# Patient Record
Sex: MALE | Race: WHITE | URBAN - METROPOLITAN AREA
[De-identification: names, ages, dates, MRNs, and addresses within clinical notes are randomized per-mention and may not be internally consistent; named-entity substitution may affect disease eponyms.]

---

## 2021-10-25 ENCOUNTER — OFFICE VISIT (OUTPATIENT)
Dept: ENDOCRINOLOGY | Age: 71
End: 2021-10-25
Payer: MEDICARE

## 2021-10-25 VITALS
HEIGHT: 72 IN | WEIGHT: 198.4 LBS | SYSTOLIC BLOOD PRESSURE: 123 MMHG | HEART RATE: 65 BPM | BODY MASS INDEX: 26.87 KG/M2 | DIASTOLIC BLOOD PRESSURE: 80 MMHG

## 2021-10-25 DIAGNOSIS — E03.9 ACQUIRED HYPOTHYROIDISM: ICD-10-CM

## 2021-10-25 DIAGNOSIS — Z79.4 TYPE 2 DIABETES MELLITUS WITH HYPERGLYCEMIA, WITH LONG-TERM CURRENT USE OF INSULIN (HCC): Primary | ICD-10-CM

## 2021-10-25 DIAGNOSIS — E11.65 TYPE 2 DIABETES MELLITUS WITH HYPERGLYCEMIA, WITH LONG-TERM CURRENT USE OF INSULIN (HCC): Primary | ICD-10-CM

## 2021-10-25 LAB — HBA1C MFR BLD HPLC: 8.4 %

## 2021-10-25 PROCEDURE — 3046F HEMOGLOBIN A1C LEVEL >9.0%: CPT | Performed by: INTERNAL MEDICINE

## 2021-10-25 PROCEDURE — 1101F PT FALLS ASSESS-DOCD LE1/YR: CPT | Performed by: INTERNAL MEDICINE

## 2021-10-25 PROCEDURE — G8427 DOCREV CUR MEDS BY ELIG CLIN: HCPCS | Performed by: INTERNAL MEDICINE

## 2021-10-25 PROCEDURE — 3017F COLORECTAL CA SCREEN DOC REV: CPT | Performed by: INTERNAL MEDICINE

## 2021-10-25 PROCEDURE — 2022F DILAT RTA XM EVC RTNOPTHY: CPT | Performed by: INTERNAL MEDICINE

## 2021-10-25 PROCEDURE — G8419 CALC BMI OUT NRM PARAM NOF/U: HCPCS | Performed by: INTERNAL MEDICINE

## 2021-10-25 PROCEDURE — G8432 DEP SCR NOT DOC, RNG: HCPCS | Performed by: INTERNAL MEDICINE

## 2021-10-25 PROCEDURE — 83036 HEMOGLOBIN GLYCOSYLATED A1C: CPT | Performed by: INTERNAL MEDICINE

## 2021-10-25 PROCEDURE — 99204 OFFICE O/P NEW MOD 45 MIN: CPT | Performed by: INTERNAL MEDICINE

## 2021-10-25 PROCEDURE — G8536 NO DOC ELDER MAL SCRN: HCPCS | Performed by: INTERNAL MEDICINE

## 2021-10-25 RX ORDER — LEVOTHYROXINE SODIUM 88 UG/1
1 TABLET ORAL DAILY
COMMUNITY
Start: 2021-09-24

## 2021-10-25 RX ORDER — ASPIRIN 81 MG/1
1 TABLET ORAL DAILY
COMMUNITY
Start: 2021-07-26 | End: 2022-08-01 | Stop reason: SDUPTHER

## 2021-10-25 RX ORDER — INSULIN ASPART 100 [IU]/ML
8 INJECTION, SOLUTION INTRAVENOUS; SUBCUTANEOUS
COMMUNITY

## 2021-10-25 RX ORDER — INSULIN DETEMIR 100 [IU]/ML
12 INJECTION, SOLUTION SUBCUTANEOUS
COMMUNITY
Start: 2021-09-28 | End: 2022-08-01 | Stop reason: SDUPTHER

## 2021-10-25 NOTE — PROGRESS NOTES
Chief Complaint   Patient presents with    New Patient     PCP and Pharmacy verified    Diabetes       HPI  This is a 69-year-old male with a diagnosis of type 2 diabetes mellitus made in 2008 when he presented with blurred vision. He has been on insulin ever since. He recently moved from [place] where he was followed by an endocrinologist to his vacation home at [facility] where he will live permanently. He is referred by Chelsea Barnes who we also see for his diabetes. He has been on basal bolus insulin ever since diagnosis. Current diabetes medications  Levemir 24 units at bedtime  NovoLog 6 for breakfast, 8-10 for lunch, and 10-14 for dinner    He uses a Dexcom G6 system. He monitors his blood sugar and actually the dosing of NovoLog is related primarily to his blood sugars throughout the day. His A1c today is 8.4%. His A1c in May was 7.5%. Breakfast is usually oatmeal with blueberries or cereal with blueberries and soy milk. Lunch is a sandwich with or without chips. Dinner can be chicken or pork with potatoes or occasionally he will just have a salad. He has a snack bar at night primarily because he has frequent episodes of hypoglycemia occurring about 3:00 in the morning. Past medical history is notable for mitral valve disease status post mitral valve replacement in March 2020. He also had an incidental single vessel coronary artery bypass graft. He also has a history of primary hypothyroidism. He was found to have a TSH of 4.38 in 2015 with TPO antibodies that were positive and he has been placed on levothyroxine 88 mcg daily. He was relatively asymptomatic pre and post diagnosis.     ROS  Review of Systems - General ROS: negative  Psychological ROS: negative  Ophthalmic ROS: negative  ENT ROS: negative  Respiratory ROS: no cough, shortness of breath, or wheezing  Cardiovascular ROS: no chest pain or dyspnea on exertion  Gastrointestinal ROS: no abdominal pain, change in bowel habits, or black or bloody stools  Genito-Urinary ROS: no dysuria, trouble voiding, or hematuria  Musculoskeletal ROS: negative  positive for - pain in wrist - right  Neurological ROS: no TIA or stroke symptoms  Dermatological ROS: negative  No family history on file. Social History     Socioeconomic History    Marital status: UNKNOWN     Spouse name: Not on file    Number of children: Not on file    Years of education: Not on file    Highest education level: Not on file   Tobacco Use    Smoking status: Never Smoker    Smokeless tobacco: Never Used     Social Determinants of Health     Financial Resource Strain:     Difficulty of Paying Living Expenses:    Food Insecurity:     Worried About Running Out of Food in the Last Year:     [address] in the Last Year:    Transportation Needs:     Lack of Transportation (Medical):      Lack of Transportation (Non-Medical):    Physical Activity:     Days of Exercise per Week:     Minutes of Exercise per Session:    Stress:     Feeling of Stress :    Social Connections:     Frequency of Communication with Friends and Family:     Frequency of Social Gatherings with Friends and Family:     Attends Zoroastrianism Services:     Active Member of [address] [place] [address] or Organizations:     Attends Club or Organization Meetings:     Marital Status:         Vitals:    10/25/21 1114   BP: 123/80   Pulse: 65   Weight: 198 lb 6.4 oz (90 kg)   Height: 6' (1.829 m)   PainSc:   0 - No pain        Physical Examination: General appearance - alert, well appearing, and in no distress  Mental status - alert, oriented to person, place, and time  Neck - supple, no significant adenopathy, thyroid exam: thyroid is normal in size without nodules or tenderness  Chest - clear to auscultation, no wheezes, rales or rhonchi, symmetric air entry  Heart - normal rate, regular rhythm, normal S1, S2, no murmurs, rubs, clicks or gallops  Abdomen - soft, nontender, nondistended, no masses or organomegaly  Neurological - alert, oriented, normal speech, no focal findings or movement disorder noted, pain overlying the right wrist  Musculoskeletal - no joint tenderness, deformity or swelling  Extremities - peripheral pulses normal, no pedal edema, no clubbing or cyanosis  Skin - normal coloration and turgor, no rashes, no suspicious skin lesions noted    Diabetic foot exam:     Left: Reflexes 2+     Vibratory sensation diminished    Filament test normal sensation with micro filament   Pulse DP: 2+ (normal)   Pulse PT: 2+ (normal)   Deformities: None  Right: Reflexes 2+   Vibratory sensation diminished   Filament test normal sensation with micro filament   Pulse DP: 2+ (normal)   Pulse PT: 2+ (normal)   Deformities: None      Test provided by his previous endocrinologist include an   A1c of 7.5% in May 2021  Creatinine 0.92  Microalbumin less than 6  TSH 2.28  ASSESSMENT & PLAN  1. Type 2 diabetes mellitus with an A1c of 8.4% on basal bolus insulin but with frequent overnight hypoglycemia  2. Primary hypothyroidism with a recent TSH that was in normal range    Plan:  1. I have split the Levemir to 12 units twice daily to avoid the overnight lows  2. Have advised him to take 6 units of NovoLog with each meal.  It appears that he has been compensating with weaning Levemir effect by increasing NovoLog dosing. This is relating to higher and low blood sugars  3. I did set him up with clarity so that he and I can share data and adjust insulin accordingly  4. We will continue the levothyroxine at 88 mcg daily  5.   I will see him back in 3 months

## 2022-01-10 ENCOUNTER — TELEPHONE (OUTPATIENT)
Dept: ENDOCRINOLOGY | Age: 72
End: 2022-01-10

## 2022-01-10 NOTE — TELEPHONE ENCOUNTER
Pt is asking a return call @ [phone] so he can share his blood sugar reading from his machine  to Dr. Lillian Bolton.

## 2022-01-10 NOTE — TELEPHONE ENCOUNTER
Patient would like the share code for our practice to send Dr. Lucita Wills his 90 day dexcom reading report. Patient stated that he would like it to be sent via text or [employer].

## 2022-01-24 ENCOUNTER — OFFICE VISIT (OUTPATIENT)
Dept: ENDOCRINOLOGY | Age: 72
End: 2022-01-24
Payer: MEDICARE

## 2022-01-24 VITALS
HEIGHT: 72 IN | DIASTOLIC BLOOD PRESSURE: 68 MMHG | SYSTOLIC BLOOD PRESSURE: 139 MMHG | HEART RATE: 60 BPM | BODY MASS INDEX: 25.65 KG/M2 | WEIGHT: 189.4 LBS

## 2022-01-24 DIAGNOSIS — Z79.4 TYPE 2 DIABETES MELLITUS WITH HYPERGLYCEMIA, WITH LONG-TERM CURRENT USE OF INSULIN (HCC): Primary | ICD-10-CM

## 2022-01-24 DIAGNOSIS — E11.65 TYPE 2 DIABETES MELLITUS WITH HYPERGLYCEMIA, WITH LONG-TERM CURRENT USE OF INSULIN (HCC): Primary | ICD-10-CM

## 2022-01-24 LAB — HBA1C MFR BLD HPLC: 9.1 %

## 2022-01-24 PROCEDURE — 3046F HEMOGLOBIN A1C LEVEL >9.0%: CPT | Performed by: INTERNAL MEDICINE

## 2022-01-24 PROCEDURE — G8428 CUR MEDS NOT DOCUMENT: HCPCS | Performed by: INTERNAL MEDICINE

## 2022-01-24 PROCEDURE — 99214 OFFICE O/P EST MOD 30 MIN: CPT | Performed by: INTERNAL MEDICINE

## 2022-01-24 PROCEDURE — G8419 CALC BMI OUT NRM PARAM NOF/U: HCPCS | Performed by: INTERNAL MEDICINE

## 2022-01-24 PROCEDURE — G8536 NO DOC ELDER MAL SCRN: HCPCS | Performed by: INTERNAL MEDICINE

## 2022-01-24 PROCEDURE — 1101F PT FALLS ASSESS-DOCD LE1/YR: CPT | Performed by: INTERNAL MEDICINE

## 2022-01-24 PROCEDURE — 3017F COLORECTAL CA SCREEN DOC REV: CPT | Performed by: INTERNAL MEDICINE

## 2022-01-24 PROCEDURE — 83036 HEMOGLOBIN GLYCOSYLATED A1C: CPT | Performed by: INTERNAL MEDICINE

## 2022-01-24 PROCEDURE — G8432 DEP SCR NOT DOC, RNG: HCPCS | Performed by: INTERNAL MEDICINE

## 2022-01-24 PROCEDURE — 2022F DILAT RTA XM EVC RTNOPTHY: CPT | Performed by: INTERNAL MEDICINE

## 2022-01-24 RX ORDER — BLOOD SUGAR DIAGNOSTIC
400 STRIP MISCELLANEOUS SEE ADMIN INSTRUCTIONS
COMMUNITY

## 2022-01-24 RX ORDER — ATORVASTATIN CALCIUM 40 MG/1
40 TABLET, FILM COATED ORAL DAILY
COMMUNITY
Start: 2021-11-04

## 2022-01-24 RX ORDER — BLOOD-GLUCOSE TRANSMITTER
EACH MISCELLANEOUS
COMMUNITY
Start: 2021-11-04 | End: 2022-02-16 | Stop reason: SDUPTHER

## 2022-01-24 RX ORDER — BLOOD-GLUCOSE SENSOR
EACH MISCELLANEOUS
COMMUNITY
Start: 2022-01-07 | End: 2022-02-16 | Stop reason: SDUPTHER

## 2022-01-24 RX ORDER — LOSARTAN POTASSIUM 25 MG/1
TABLET ORAL
COMMUNITY
Start: 2022-01-08

## 2022-01-24 NOTE — PATIENT INSTRUCTIONS
With each meal determine the grams of carbohydrate and the blood sugar:    1 units of Novolog for every 10 grams of carbohydrate plus  Add 1 units for every 50 > 150   Combine both for the mealtime bolus of Novolog    After 2 weeks send me a message and I will look at clarity tracing and contact you

## 2022-01-24 NOTE — PROGRESS NOTES
This is a 76-year-old male with a diagnosis of type 2 diabetes mellitus made in 2008 when he presented with blurred vision. He has been on insulin ever since. He recently moved from [place] where he was followed by an endocrinologist to his vacation home at [facility] where he will live permanently. He is referred by Freda Barron who we also see for his diabetes.     He has been on basal bolus insulin ever since diagnosis.     Current diabetes medications  Levemir 12 units BID  NovoLog 6 for breakfast, 8-10 for lunch, and 10-14 for dinner     He uses a Dexcom G6 system. He monitors his blood sugar and actually the dosing of NovoLog is related primarily to his blood sugars throughout the day. When I saw him last his A1c was 8.4%. His A1c today is 9.1%     Since moving to his vacation home, they have been doing a lot of remodeling and there is been a lot of chaos. As a result, he is not paying as much attention to his diabetes as he was previously. I did download the Dexcom and there are lots of very high blood sugars consistent with the A1c of 9.1%. He is surprised. Breakfast is usually oatmeal with blueberries or cereal with blueberries and soy milk. Lunch is a sandwich with or without chips. [employer] can be chicken or pork with potatoes or occasionally he will just have a salad.        Past medical history is notable for mitral valve disease status post mitral valve replacement in March 2020. He also had an incidental single vessel coronary artery bypass graft. He also has a history of primary hypothyroidism. He was found to have a TSH of 4.38 in 2015 with TPO antibodies that were positive and he has been placed on levothyroxine 88 mcg daily. He was relatively asymptomatic pre and post diagnosis.     Examination  Blood pressure 139/68  Pulse 60  Weight 189  BMI 25.7    HEENT unremarkable  Lungs clear  Heart reveals a regular rate and rhythm  Abdomen benign  Extremities unremarkable  Diabetic foot exam: Left Foot:   Visual Exam: normal    Pulse DP: 2+ (normal)   Filament test: normal sensation    Vibratory sensation: normal      Right Foot:   Visual Exam: normal    Pulse DP: 2+ (normal)   Filament test: normal sensation    Vibratory sensation: normal    Impression  1. Type 2 diabetes mellitus with significant deterioration in glucose control likely related to some inattention to his diabetes related in turn to the activities around his new home  2. Primary hypothyroidism    Plan:  1. I have given him direction in terms of basal insulin, mealtime insulin, and correction. He was given the after visit summary to take with him. 2.  I asked him that once he gets his new phone set up with clarity to let me know and so we can begin to make adjustments in his insulin. Hopefully the 1-10 and 1-50 will be correct but we will certainly make changes as needed. He did have diabetes education at [address] [place] [address] in 2019 but likely needs further guidance on carbs. 3.  I will see him back in 3 months. Please note that this dictation was completed with [employer], the computer voice recognition software. Quite often unanticipated grammatical, syntax, homophones, and other interpretive errors are inadvertently transcribed by the computer software. Please disregard these errors. Please excuse any errors that have escaped final proofreading.

## 2022-02-07 ENCOUNTER — TELEPHONE (OUTPATIENT)
Dept: ENDOCRINOLOGY | Age: 72
End: 2022-02-07

## 2022-02-07 NOTE — TELEPHONE ENCOUNTER
2/7/2022  2:55 PM    Patient requesting his chart notes be faxed to [facility] on [address] in [place] at [phone] concerning his monthly renewal of his Dexcom sensors.   Can reach patient at [phone]    Thanks,  Shannon Rainey

## 2022-02-15 ENCOUNTER — TELEPHONE (OUTPATIENT)
Dept: ENDOCRINOLOGY | Age: 72
End: 2022-02-15

## 2022-02-15 NOTE — TELEPHONE ENCOUNTER
2/15/2022  4:19 PM    Nick from [employer] called and stated that the new rx needs to be sent directly to the pharmacy he is at. That address is [id number] [place] [address]. He can be reached at [phone].      Thanks,   Mony Morales

## 2022-02-15 NOTE — TELEPHONE ENCOUNTER
Spoke to [employer] with [facility] [employer]. Informed him that we faxed over the medicare form for the pt yesterday and received confirmation that it was sent.  stated that the [facility] department stated that they did not receive it.  asked for a verbal diagnosis code so that he can give it to the medicare department. I have given him E11.65 (Type 2 diabetes).

## 2022-02-15 NOTE — TELEPHONE ENCOUNTER
[facility] called 2/15 @ 2:52pm. Stated that the patient was looking to et his dexcom rx's covered through medicare, but medicare did not have the diagnosis codes. Please call medicare to give these codes. Medicare # [id number].

## 2022-02-15 NOTE — TELEPHONE ENCOUNTER
2/15/2022  12:20 PM    Mr. Mina Colon called and stated he would like to speak with the nurse in regards to his G6 prescription. He can be reached at [phone].     Thanks,   Cuco Hughes

## 2022-02-16 RX ORDER — BLOOD-GLUCOSE TRANSMITTER
EACH MISCELLANEOUS
Qty: 1 EACH | Refills: 3 | Status: SHIPPED | OUTPATIENT
Start: 2022-02-16

## 2022-02-16 RX ORDER — BLOOD-GLUCOSE SENSOR
EACH MISCELLANEOUS
Qty: 9 EACH | Refills: 3 | Status: SHIPPED | OUTPATIENT
Start: 2022-02-16

## 2022-02-16 NOTE — TELEPHONE ENCOUNTER
Requested Prescriptions     Pending Prescriptions Disp Refills    Dexcom G6 Sensor shy 9 Each 3     Sig: USE AS DIRECTED. CHANGE EVERY 10 DAYS    Blood-Glucose Transmitter (Dexcom G6 Transmitter) shy 1 Each 3     Sig: USE AS DIRECTED.  CHANGE EVERY 90 DAYS

## 2022-04-25 ENCOUNTER — OFFICE VISIT (OUTPATIENT)
Dept: ENDOCRINOLOGY | Age: 72
End: 2022-04-25
Payer: MEDICARE

## 2022-04-25 VITALS
BODY MASS INDEX: 26.09 KG/M2 | HEART RATE: 57 BPM | HEIGHT: 72 IN | SYSTOLIC BLOOD PRESSURE: 132 MMHG | WEIGHT: 192.6 LBS | DIASTOLIC BLOOD PRESSURE: 63 MMHG

## 2022-04-25 DIAGNOSIS — Z79.4 TYPE 2 DIABETES MELLITUS WITH HYPERGLYCEMIA, WITH LONG-TERM CURRENT USE OF INSULIN (HCC): Primary | ICD-10-CM

## 2022-04-25 DIAGNOSIS — E11.65 TYPE 2 DIABETES MELLITUS WITH HYPERGLYCEMIA, WITH LONG-TERM CURRENT USE OF INSULIN (HCC): Primary | ICD-10-CM

## 2022-04-25 LAB — HBA1C MFR BLD HPLC: 7.3 %

## 2022-04-25 PROCEDURE — 1101F PT FALLS ASSESS-DOCD LE1/YR: CPT | Performed by: INTERNAL MEDICINE

## 2022-04-25 PROCEDURE — G8419 CALC BMI OUT NRM PARAM NOF/U: HCPCS | Performed by: INTERNAL MEDICINE

## 2022-04-25 PROCEDURE — G8432 DEP SCR NOT DOC, RNG: HCPCS | Performed by: INTERNAL MEDICINE

## 2022-04-25 PROCEDURE — G8427 DOCREV CUR MEDS BY ELIG CLIN: HCPCS | Performed by: INTERNAL MEDICINE

## 2022-04-25 PROCEDURE — G8536 NO DOC ELDER MAL SCRN: HCPCS | Performed by: INTERNAL MEDICINE

## 2022-04-25 PROCEDURE — 3051F HG A1C>EQUAL 7.0%<8.0%: CPT | Performed by: INTERNAL MEDICINE

## 2022-04-25 PROCEDURE — 2022F DILAT RTA XM EVC RTNOPTHY: CPT | Performed by: INTERNAL MEDICINE

## 2022-04-25 PROCEDURE — 99214 OFFICE O/P EST MOD 30 MIN: CPT | Performed by: INTERNAL MEDICINE

## 2022-04-25 PROCEDURE — 3017F COLORECTAL CA SCREEN DOC REV: CPT | Performed by: INTERNAL MEDICINE

## 2022-04-25 PROCEDURE — 83036 HEMOGLOBIN GLYCOSYLATED A1C: CPT | Performed by: INTERNAL MEDICINE

## 2022-04-25 NOTE — PROGRESS NOTES
This is a 78-year-old male with a diagnosis of type 2 diabetes mellitus made in 2008 when he presented with blurred vision. [facility] has been on insulin ever since. [facility] recently moved from [place] where he was followed by an endocrinologist to his vacation home at [facility] where he will live permanently. [facility] is referred by Marcelle Sapp who we also see for his diabetes.     He has been on basal bolus insulin ever since diagnosis.     Current diabetes medications  Levemir 12 units BID  NovoLog 6 per meal plus correction of 1:50     He uses a Dexcom G6 system.  He monitors his blood sugar and actually the dosing of NovoLog is related primarily to his blood sugars throughout the day. When I saw him last his A1c was 9.1%. His A1c today is 7.3%            Since moving to his vacation home, they have been doing a lot of remodeling and there is been a lot of chaos. As a result, he is not paying as much attention to his diabetes as he was previously. I did download the Dexcom and there are lots of very high blood sugars consistent with the A1c of 9.1%. He is surprised. Breakfast is usually oatmeal with blueberries or cereal with blueberries and soy milk. Kamari Mai is a sandwich with or without chips. Volney Lout can be chicken or pork with potatoes or occasionally he will just have a salad.      Past medical history is notable for mitral valve disease status post mitral valve replacement in March 2020. [facility] also had an incidental single vessel coronary artery bypass graft. He also has a history of primary hypothyroidism.  He was found to have a TSH of 4.38 in 2015 with TPO antibodies that were positive and he has been placed on levothyroxine 88 mcg daily. [facility] was relatively asymptomatic pre and post diagnosis.       Examination  Blood pressure 132/63  Pulse 88  Weight 192  BMI 26.1  HEENT unremarkable  Lungs clear  Heart reveals a regular rate and rhythm  Abdomen benign  Extremities unremarkable  Diabetic foot exam:     Left Foot:   Visual Exam: normal    Pulse DP: 2+ (normal)   Filament test: normal sensation    Vibratory sensation: normal      Right Foot:   Visual Exam: normal    Pulse DP: 2+ (normal)   Filament test: normal sensation    Vibratory sensation: normal    Impression  1. Diabetes mellitus being managed as type I on basal bolus insulin now with improved glucose control  2. Primary hypothyroidism on 88 mcg of levothyroxine daily    Plan:  1. He would like clarification on whether he is type I or type II. He is curious about the possibility of switching to a GLP-1 instead of the insulin. 2.  I have ordered glutamic acid decarboxylase antibodies. I do note that he has been treated with insulin ever since diagnosis suggesting that he is more likely a type I and type II  3. I will call him with the results of the osiel antibodies  4. I will see him back in 4 months.     ADDENDUM: OSIEL Ab > 25,000

## 2022-08-01 ENCOUNTER — OFFICE VISIT (OUTPATIENT)
Dept: ENDOCRINOLOGY | Age: 72
End: 2022-08-01
Payer: MEDICARE

## 2022-08-01 VITALS
DIASTOLIC BLOOD PRESSURE: 70 MMHG | HEIGHT: 72 IN | SYSTOLIC BLOOD PRESSURE: 124 MMHG | HEART RATE: 59 BPM | WEIGHT: 190 LBS | BODY MASS INDEX: 25.73 KG/M2

## 2022-08-01 DIAGNOSIS — Z79.4 TYPE 2 DIABETES MELLITUS WITH HYPERGLYCEMIA, WITH LONG-TERM CURRENT USE OF INSULIN (HCC): Primary | ICD-10-CM

## 2022-08-01 DIAGNOSIS — E11.65 TYPE 2 DIABETES MELLITUS WITH HYPERGLYCEMIA, WITH LONG-TERM CURRENT USE OF INSULIN (HCC): Primary | ICD-10-CM

## 2022-08-01 LAB — HBA1C MFR BLD HPLC: 7.4 %

## 2022-08-01 PROCEDURE — 2022F DILAT RTA XM EVC RTNOPTHY: CPT | Performed by: INTERNAL MEDICINE

## 2022-08-01 PROCEDURE — G8536 NO DOC ELDER MAL SCRN: HCPCS | Performed by: INTERNAL MEDICINE

## 2022-08-01 PROCEDURE — G8432 DEP SCR NOT DOC, RNG: HCPCS | Performed by: INTERNAL MEDICINE

## 2022-08-01 PROCEDURE — G8427 DOCREV CUR MEDS BY ELIG CLIN: HCPCS | Performed by: INTERNAL MEDICINE

## 2022-08-01 PROCEDURE — 99214 OFFICE O/P EST MOD 30 MIN: CPT | Performed by: INTERNAL MEDICINE

## 2022-08-01 PROCEDURE — 1123F ACP DISCUSS/DSCN MKR DOCD: CPT | Performed by: INTERNAL MEDICINE

## 2022-08-01 PROCEDURE — 1101F PT FALLS ASSESS-DOCD LE1/YR: CPT | Performed by: INTERNAL MEDICINE

## 2022-08-01 PROCEDURE — 3051F HG A1C>EQUAL 7.0%<8.0%: CPT | Performed by: INTERNAL MEDICINE

## 2022-08-01 PROCEDURE — G8417 CALC BMI ABV UP PARAM F/U: HCPCS | Performed by: INTERNAL MEDICINE

## 2022-08-01 PROCEDURE — 3017F COLORECTAL CA SCREEN DOC REV: CPT | Performed by: INTERNAL MEDICINE

## 2022-08-01 PROCEDURE — 83036 HEMOGLOBIN GLYCOSYLATED A1C: CPT | Performed by: INTERNAL MEDICINE

## 2022-08-01 RX ORDER — ASPIRIN 81 MG/1
81 TABLET ORAL DAILY
Qty: 90 TABLET | Refills: 3 | Status: SHIPPED | OUTPATIENT
Start: 2022-08-01

## 2022-08-01 RX ORDER — PEN NEEDLE, DIABETIC 31 GX3/16"
NEEDLE, DISPOSABLE MISCELLANEOUS
Qty: 200 PEN NEEDLE | Refills: 3 | Status: SHIPPED | OUTPATIENT
Start: 2022-08-01

## 2022-08-01 RX ORDER — INSULIN DETEMIR 100 [IU]/ML
12 INJECTION, SOLUTION SUBCUTANEOUS
Qty: 30 ML | Refills: 3 | Status: SHIPPED | OUTPATIENT
Start: 2022-08-01

## 2022-08-01 NOTE — PROGRESS NOTES
This is a 65-year-old male with a diagnosis of type 1 diabetes mellitus made in 2008 when he presented with blurred vision. At our last visit, I was concerned that he had type 1 diabetes and obtain glutamic acid decarboxylase antibodies which returned positive (ALAN Ab + > 25,000) May 2022. He has been on insulin ever since. He recently moved from [place] [address] [facility] where he was followed by an endocrinologist to his vacation home at [facility] where he will live permanently. He is referred by Haydee Lopez who we also see for his diabetes. He has been on basal bolus insulin ever since diagnosis. Current diabetes medications  Levemir 12 units BID  NovoLog 6 per meal plus correction of 1:50    Past medical history is notable for mitral valve disease status post mitral valve replacement in March 2020. He also had an incidental single vessel coronary artery bypass graft. He also has a history of primary hypothyroidism. He was found to have a TSH of 4.38 in 2015 with TPO antibodies that were positive and he has been placed on levothyroxine 88 mcg daily. He was relatively asymptomatic pre and post diagnosis. He uses a Dexcom G6 system. He monitors his blood sugar and actually the dosing of NovoLog is related primarily to his blood sugars throughout the day. When I saw him last his A1c was 9.1%. His A1c today is 7.4%        Saw him last he has retired and is much more relaxed. His diet is fairly low carbohydrate and he is trying to focus on his carbohydrate intake more. Most mornings he is having at his and howard instead of oatmeal nuts and bread. He tries to bolus prior to the meal but sometimes is late on the boluses which contribute to spikes in blood sugar.   He is having occasional mild hypoglycemia but usually these are after he corrects an elevated blood sugar following a meal.    Examination  Blood pressure 124/70  Pulse 64  Weight 190  BMI 25.8  HEENT unremarkable  Lungs clear  Heart reveals a regular rate and rhythm  Abdomen benign  Extremities unremarkable  Diabetic foot exam:     Left Foot:   Visual Exam: normal    Pulse DP: 2+ (normal)   Filament test: normal sensation    Vibratory sensation: normal      Right Foot:   Visual Exam: normal    Pulse DP: 2+ (normal)   Filament test: normal sensation    Vibratory sensation: normal     Impression  1. Type 1 diabetes mellitus with fair to good glucose control on basal bolus insulin    Plan:  1. No changes in the regimen were made. 2.  I did advise him to try to bolus prior to his meals and to avoid stacking insulin after the meal  3.   I will see him back in 6 months at his request.

## 2022-11-21 RX ORDER — INSULIN ASPART 100 [IU]/ML
INJECTION, SOLUTION INTRAVENOUS; SUBCUTANEOUS
Qty: 30 ML | Refills: 3 | Status: SHIPPED | OUTPATIENT
Start: 2022-11-21

## 2022-11-21 NOTE — TELEPHONE ENCOUNTER
In the last office note, it states that the pt's Novolog regimen is 6 per meal plus correction of 1:50.

## 2022-12-08 RX ORDER — LEVOTHYROXINE SODIUM 88 UG/1
88 TABLET ORAL
Qty: 90 TABLET | Refills: 3 | Status: SHIPPED | OUTPATIENT
Start: 2022-12-08

## 2022-12-08 NOTE — TELEPHONE ENCOUNTER
Requested Prescriptions     Pending Prescriptions Disp Refills    levothyroxine (SYNTHROID) 88 mcg tablet 90 Tablet 3     Sig: Take 1 Tablet by mouth Daily (before breakfast).

## 2023-02-06 ENCOUNTER — OFFICE VISIT (OUTPATIENT)
Dept: ENDOCRINOLOGY | Age: 73
End: 2023-02-06
Payer: MEDICARE

## 2023-02-06 VITALS
WEIGHT: 192.8 LBS | HEART RATE: 51 BPM | HEIGHT: 72 IN | SYSTOLIC BLOOD PRESSURE: 139 MMHG | BODY MASS INDEX: 26.11 KG/M2 | DIASTOLIC BLOOD PRESSURE: 67 MMHG

## 2023-02-06 DIAGNOSIS — Z79.4 TYPE 2 DIABETES MELLITUS WITH HYPERGLYCEMIA, WITH LONG-TERM CURRENT USE OF INSULIN (HCC): Primary | ICD-10-CM

## 2023-02-06 DIAGNOSIS — E11.65 TYPE 2 DIABETES MELLITUS WITH HYPERGLYCEMIA, WITH LONG-TERM CURRENT USE OF INSULIN (HCC): Primary | ICD-10-CM

## 2023-02-06 LAB — HBA1C MFR BLD HPLC: 7.5 %

## 2023-02-06 PROCEDURE — 3051F HG A1C>EQUAL 7.0%<8.0%: CPT | Performed by: INTERNAL MEDICINE

## 2023-02-06 PROCEDURE — G8427 DOCREV CUR MEDS BY ELIG CLIN: HCPCS | Performed by: INTERNAL MEDICINE

## 2023-02-06 PROCEDURE — 3017F COLORECTAL CA SCREEN DOC REV: CPT | Performed by: INTERNAL MEDICINE

## 2023-02-06 PROCEDURE — G8432 DEP SCR NOT DOC, RNG: HCPCS | Performed by: INTERNAL MEDICINE

## 2023-02-06 PROCEDURE — 1123F ACP DISCUSS/DSCN MKR DOCD: CPT | Performed by: INTERNAL MEDICINE

## 2023-02-06 PROCEDURE — 83036 HEMOGLOBIN GLYCOSYLATED A1C: CPT | Performed by: INTERNAL MEDICINE

## 2023-02-06 PROCEDURE — 1101F PT FALLS ASSESS-DOCD LE1/YR: CPT | Performed by: INTERNAL MEDICINE

## 2023-02-06 PROCEDURE — 2022F DILAT RTA XM EVC RTNOPTHY: CPT | Performed by: INTERNAL MEDICINE

## 2023-02-06 PROCEDURE — G8417 CALC BMI ABV UP PARAM F/U: HCPCS | Performed by: INTERNAL MEDICINE

## 2023-02-06 PROCEDURE — 99214 OFFICE O/P EST MOD 30 MIN: CPT | Performed by: INTERNAL MEDICINE

## 2023-02-06 PROCEDURE — G8536 NO DOC ELDER MAL SCRN: HCPCS | Performed by: INTERNAL MEDICINE

## 2023-02-06 NOTE — PROGRESS NOTES
This is a 65-year-old male with a diagnosis of type 1 diabetes mellitus made in 2008 when he presented with blurred vision. At our last visit, I was concerned that he had type 1 diabetes and obtain glutamic acid decarboxylase antibodies which returned positive (ALAN Ab + > 25,000) May 2022. He has been on insulin ever since. He recently moved from [place] where he was followed by an endocrinologist to his vacation home at [facility] where he will live permanently. He is referred by Kumar Simpson who we also see for his diabetes. He has been on basal bolus insulin ever since diagnosis. Current diabetes medications  Levemir 12 units BID  NovoLog 6 per meal plus correction of 1:50     Past medical history is notable for mitral valve disease status post mitral valve replacement in March 2020. He also had an incidental single vessel coronary artery bypass graft. He also has a history of primary hypothyroidism. He was found to have a TSH of 4.38 in 2015 with TPO antibodies that were positive and he has been placed on levothyroxine 88 mcg daily. He was relatively asymptomatic pre and post diagnosis. He uses a Dexcom G6 system. He monitors his blood sugar and actually the dosing of NovoLog is related primarily to his blood sugars throughout the day. When I saw him last his A1c was 9.1%. His A1c today is 7.5%      This gentleman continues to do very well but has many questions. He particularly focused on treatment of hypoglycemia. He admits that anytime he gets symptoms of low blood sugar, he has a tendency to overtreated. He has also observed that following physical activity (generally walking) he may have a late afternoon drop in blood sugar. He describes them as being cranky. His diet is unchanged. His diet is fairly low carbohydrate and he is trying to focus on his carbohydrate intake more. Most mornings he is having at his and howard instead of oatmeal nuts and bread.   He tries to bolus prior to the meal but sometimes is late on the boluses which contribute to spikes in blood sugar. Examination  Blood pressure 139/67  Pulse 80  Weight 192  BMI 26.1    Impression  1. Type 1 diabetes mellitus on basal bolus insulin with an A1c of 7.5%  2. Primary hypothyroidism on 88 mcg of levothyroxine daily    Plan:  1. We spent a great deal of time talking about adjustments in his basal insulin. He is getting ready to go to a golf tournament and I advised him to decrease his daytime Levemir to prevent lows. 2.  I advised him to use 15 g and wait 15 minutes for episodes of low blood sugar. 3.  We did talk about the OmniPod 5. He is not sure yet whether he wants to do that or not. 4.  I did advise him about the Dexcom 7  5. I will see him back in 6 months.

## 2023-02-22 RX ORDER — BLOOD-GLUCOSE SENSOR
EACH MISCELLANEOUS
Qty: 9 EACH | Refills: 3 | Status: SHIPPED | OUTPATIENT
Start: 2023-02-22

## 2023-02-22 NOTE — TELEPHONE ENCOUNTER
Requested Prescriptions     Pending Prescriptions Disp Refills    Dexcom G6 Sensor shy 9 Each 3     Sig: USE AS DIRECTED.  CHANGE EVERY 10 DAYS

## 2023-03-06 ENCOUNTER — TELEPHONE (OUTPATIENT)
Dept: ENDOCRINOLOGY | Age: 73
End: 2023-03-06

## 2023-03-06 DIAGNOSIS — E03.9 ACQUIRED HYPOTHYROIDISM: Primary | ICD-10-CM

## 2023-03-06 LAB — CREATININE, EXTERNAL: 0.99

## 2023-03-06 NOTE — TELEPHONE ENCOUNTER
Bean Baxter with [facility] called and stated that the pt had thyroid labs done and that the results were abnormal. She stated that she sent the results electronically to Dr. Jessica Huston and would like for hin to take a look at them. Dr. Manjit Carmen was going to change the pt's levothyroxine dosage however, he would like to defer it to Dr. Jessica Huston. Bean Baxter also stated that the pt would like to try the brand synthroid medication.

## 2023-03-08 DIAGNOSIS — E03.9 ACQUIRED HYPOTHYROIDISM: ICD-10-CM

## 2023-03-08 RX ORDER — LEVOTHYROXINE SODIUM 88 UG/1
88 TABLET ORAL
Qty: 90 TABLET | Refills: 3 | Status: SHIPPED | OUTPATIENT
Start: 2023-03-08 | End: 2023-03-08 | Stop reason: SDUPTHER

## 2023-03-08 RX ORDER — LEVOTHYROXINE SODIUM 88 UG/1
88 TABLET ORAL
Qty: 90 TABLET | Refills: 3 | Status: SHIPPED | OUTPATIENT
Start: 2023-03-08

## 2023-03-08 NOTE — TELEPHONE ENCOUNTER
Spoke to Faiza and informed her that we did not receive the test results for the pt. Faiza was able to give me a verbal reading of the pt's thyroid labs. Free T4: 2.03    TSH: 1.2    Faiza stated that the pt would like a call back from Dr. Price Reason to discuss his results. Faiza also stated that the pt is currently taking 88 mcg of levothyroxine, but would like to speak with Dr. Price Reason about being put on brand synthroid. The pt's phone number is [phone].

## 2023-03-13 ENCOUNTER — TELEPHONE (OUTPATIENT)
Dept: ENDOCRINOLOGY | Age: 73
End: 2023-03-13

## 2023-03-13 NOTE — TELEPHONE ENCOUNTER
Patient left a message this morning asking us to contact [facility] to try and get a tier reduction for his Synthroid brand 88 mcg medication. [facility] however, we received a fax stating that the request has been denied. The fax stated \"the medication is already a covered drug under the pt's insurance and cannot receive a tier reduction because there are no lower cost brand name drugs. \"    I called the pt and informed him of this information. I also informed him to contact [facility]  (mail order) to see how much the medication will be witht them. Patient understood with no further questions.

## 2023-03-16 RX ORDER — INSULIN DETEMIR 100 [IU]/ML
12 INJECTION, SOLUTION SUBCUTANEOUS
Qty: 30 ML | Refills: 3 | Status: SHIPPED | OUTPATIENT
Start: 2023-03-16

## 2023-03-16 NOTE — TELEPHONE ENCOUNTER
Requested Prescriptions     Pending Prescriptions Disp Refills    insulin detemir U-100 (Levemir FlexPen) 100 unit/mL (3 mL) inpn 30 mL 3     Si Units by SubCUTAneous route Before breakfast and dinner.

## 2023-03-20 RX ORDER — LEVOTHYROXINE SODIUM 88 UG/1
88 TABLET ORAL
Qty: 90 TABLET | Refills: 3 | Status: SHIPPED | OUTPATIENT
Start: 2023-03-20

## 2023-03-20 NOTE — TELEPHONE ENCOUNTER
Pt left a message stating that he received his synthroid brand 88 mcg medication and has been taking it for about 4 days. Pt stated that he has been having leg cramps and blurred vision since starting this medication. He would prefer to go back to levothyroxine 88 mcg. Pt stated that he would like this prescription to go to the [facility] in [place] (on file).

## 2023-03-22 RX ORDER — BLOOD-GLUCOSE TRANSMITTER
EACH MISCELLANEOUS
Qty: 1 EACH | Refills: 3 | Status: SHIPPED | OUTPATIENT
Start: 2023-03-22

## 2023-03-22 RX ORDER — BLOOD-GLUCOSE SENSOR
EACH MISCELLANEOUS
Qty: 9 EACH | Refills: 3 | Status: SHIPPED | OUTPATIENT
Start: 2023-03-22

## 2023-03-22 NOTE — TELEPHONE ENCOUNTER
Requested Prescriptions     Pending Prescriptions Disp Refills    Blood-Glucose Transmitter (Dexcom G6 Transmitter) shy 1 Each 3     Sig: USE AS DIRECTED. CHANGE EVERY 90 DAYS    Dexcom G6 Sensor shy 9 Each 3     Sig: USE AS DIRECTED.  CHANGE EVERY 10 DAYS

## 2023-08-07 ENCOUNTER — OFFICE VISIT (OUTPATIENT)
Age: 73
End: 2023-08-07
Payer: COMMERCIAL

## 2023-08-07 VITALS
HEART RATE: 64 BPM | WEIGHT: 189 LBS | BODY MASS INDEX: 25.6 KG/M2 | SYSTOLIC BLOOD PRESSURE: 129 MMHG | HEIGHT: 72 IN | DIASTOLIC BLOOD PRESSURE: 74 MMHG

## 2023-08-07 DIAGNOSIS — E10.9 TYPE 1 DIABETES MELLITUS WITHOUT COMPLICATION (HCC): Primary | ICD-10-CM

## 2023-08-07 PROCEDURE — 99214 OFFICE O/P EST MOD 30 MIN: CPT | Performed by: INTERNAL MEDICINE

## 2023-08-07 PROCEDURE — 1123F ACP DISCUSS/DSCN MKR DOCD: CPT | Performed by: INTERNAL MEDICINE

## 2023-08-07 RX ORDER — INSULIN ASPART 100 [IU]/ML
INJECTION, SOLUTION INTRAVENOUS; SUBCUTANEOUS
Qty: 30 ADJUSTABLE DOSE PRE-FILLED PEN SYRINGE | Refills: 3 | Status: SHIPPED | OUTPATIENT
Start: 2023-08-07

## 2023-08-07 NOTE — PROGRESS NOTES
This is a 65-year-old male with a diagnosis of type 1 diabetes mellitus made in 2008 when he presented with blurred vision. At our last visit, I was concerned that he had type  1 diabetes and obtain glutamic acid decarboxylase antibodies which returned positive (KHOA Ab + > 25,000) May 2022. He has been on insulin ever since. He recently moved from [place] where he was followed by an endocrinologist to his vacation home at [facility] where he will live permanently. He is referred  by Laura Montoya who we also see for his diabetes. He has been on basal bolus insulin ever since diagnosis. Current diabetes medications   Levemir 12 units BID   NovoLog 6 per meal plus correction of 1:50       Past medical history is notable for mitral valve disease status post mitral valve replacement in March 2020. He also had an incidental single  vessel coronary artery bypass graft. He also has a history of primary hypothyroidism. He was found to have a TSH of 4.38 in 2015 with TPO antibodies that were  positive and he has been placed on levothyroxine 88 mcg daily. He was relatively asymptomatic pre and post diagnosis. He uses a Dexcom G6 system. He monitors his blood sugar and actually the dosing of NovoLog is related primarily to his blood sugars throughout  the day. When I saw him last his A1c was 7.5%. His A1c today is 7.9 %    His diet is unchanged. His diet is fairly low carbohydrate and he is trying  to focus on his carbohydrate intake more. Most mornings he is having at his and cueva instead of oatmeal nuts and bread. He tries to bolus prior to the meal but sometimes is late on the boluses which contribute to spikes in blood sugar. Examination  Blood pressure 129/74  Pulse 64  Weight 189  BMI 25.6  HEENT unremarkable  Lungs clear  Heart is regular rate rhythm  Abdomen benign  Extremities unremarkable      Impression  1.   Type 1 diabetes mellitus we will be glucose control basal-bolus

## 2023-09-07 RX ORDER — ASPIRIN 81 MG/1
TABLET, COATED ORAL
Qty: 90 TABLET | Refills: 3 | Status: SHIPPED | OUTPATIENT
Start: 2023-09-07

## 2023-09-12 RX ORDER — LEVOTHYROXINE SODIUM 88 UG/1
88 TABLET ORAL
Qty: 90 TABLET | Refills: 3 | Status: SHIPPED | OUTPATIENT
Start: 2023-09-12

## 2023-09-12 NOTE — TELEPHONE ENCOUNTER
Requested Prescriptions     Pending Prescriptions Disp Refills    levothyroxine (SYNTHROID) 88 MCG tablet 90 tablet 3     Sig: Take 1 tablet by mouth every morning (before breakfast)

## 2023-10-06 ENCOUNTER — TELEPHONE (OUTPATIENT)
Age: 73
End: 2023-10-06

## 2023-10-06 NOTE — TELEPHONE ENCOUNTER
Pt's wife left a message stating the pt is [place] [facility] [place]. Barbara Penelope Alex would like a call back to discuss this with Dr. Pilar Jeong.  [phone]

## 2023-10-06 NOTE — TELEPHONE ENCOUNTER
He was admitted to [facility] with cerebral hemorrhage on Monday now s/p  surgery. On anticonvulsants.   He apparently is doing well and they are hoping for discharge tomorrow

## 2023-10-12 ENCOUNTER — TELEPHONE (OUTPATIENT)
Age: 73
End: 2023-10-12

## 2023-10-12 NOTE — TELEPHONE ENCOUNTER
Pt left a message stating he was discharged from the hospital 2 days ago and thinks that his insulin needs to be adjusted. Pt stated he would like for Dr. Edel Jacobson to take a look at his dexcom readings to review his blood sugar numbers and see if any adjustments needs to be made. Pt would like us to contact his wife with the information at [phone].

## 2023-10-13 NOTE — TELEPHONE ENCOUNTER
Spoke to pt. Released from [facility] for surgical evacuation of cerebral hemorrhage. Blood sugars elevated on Levemir 11/12 and humaog 3-6 with meals.     Have increased Levemir to 14 units BID and anticipate further increase

## 2024-01-09 NOTE — TELEPHONE ENCOUNTER
Requested Prescriptions     Pending Prescriptions Disp Refills    blood glucose test strips (ACCU-CHEK GERALDINE PLUS) strip 300 each 3     Sig: Test blood sugar 3 times daily. Dx: E10.9

## 2024-01-10 RX ORDER — BLOOD SUGAR DIAGNOSTIC
STRIP MISCELLANEOUS
Qty: 300 EACH | Refills: 3 | Status: SHIPPED | OUTPATIENT
Start: 2024-01-10

## 2024-02-07 ENCOUNTER — OFFICE VISIT (OUTPATIENT)
Age: 74
End: 2024-02-07
Payer: MEDICARE

## 2024-02-07 VITALS
SYSTOLIC BLOOD PRESSURE: 131 MMHG | HEIGHT: 72 IN | BODY MASS INDEX: 25.17 KG/M2 | DIASTOLIC BLOOD PRESSURE: 72 MMHG | HEART RATE: 57 BPM | WEIGHT: 185.8 LBS

## 2024-02-07 DIAGNOSIS — E10.9 TYPE 1 DIABETES MELLITUS WITHOUT COMPLICATION (HCC): Primary | ICD-10-CM

## 2024-02-07 LAB — HBA1C MFR BLD: 8 %

## 2024-02-07 PROCEDURE — 2022F DILAT RTA XM EVC RTNOPTHY: CPT | Performed by: INTERNAL MEDICINE

## 2024-02-07 PROCEDURE — 3017F COLORECTAL CA SCREEN DOC REV: CPT | Performed by: INTERNAL MEDICINE

## 2024-02-07 PROCEDURE — 83036 HEMOGLOBIN GLYCOSYLATED A1C: CPT | Performed by: INTERNAL MEDICINE

## 2024-02-07 PROCEDURE — 99214 OFFICE O/P EST MOD 30 MIN: CPT | Performed by: INTERNAL MEDICINE

## 2024-02-07 PROCEDURE — G8428 CUR MEDS NOT DOCUMENT: HCPCS | Performed by: INTERNAL MEDICINE

## 2024-02-07 PROCEDURE — G8419 CALC BMI OUT NRM PARAM NOF/U: HCPCS | Performed by: INTERNAL MEDICINE

## 2024-02-07 PROCEDURE — 1036F TOBACCO NON-USER: CPT | Performed by: INTERNAL MEDICINE

## 2024-02-07 PROCEDURE — 1123F ACP DISCUSS/DSCN MKR DOCD: CPT | Performed by: INTERNAL MEDICINE

## 2024-02-07 PROCEDURE — 3046F HEMOGLOBIN A1C LEVEL >9.0%: CPT | Performed by: INTERNAL MEDICINE

## 2024-02-07 PROCEDURE — G8484 FLU IMMUNIZE NO ADMIN: HCPCS | Performed by: INTERNAL MEDICINE

## 2024-02-07 NOTE — PROGRESS NOTES
doing 1 unit for every 25 and correction and has been under bolusing for the meals in general which is leading to the highs and lows.    Examination  Blood pressure 131/72  Pulse 57  Weight 185  BMI 25.2  HEENT unremarkable  Lungs clear  Heart reveals a regular rate and rhythm  Abdomen benign  Extremities unremarkable    Impression  1.  Type 2 diabetes mellitus with fair glucose control on basal bolus insulin  2.  Hypothyroidism  3.  Recent cerebral hemorrhage with homonymous hemianopsia    Plan:  1.  I have encouraged him to focus more on carb counting and bolusing for his meals Premeal rather than correcting  2.  I also emphasized that he should do no more than 1 unit for every 50/150 to correct  3.  His wife and I had a conversation about an insulin pump.  After consideration, both he and his wife felt that a pump was not a good option for him right now  4.  I will see him back in 3 to 4 months

## 2024-03-28 RX ORDER — INSULIN GLARGINE 100 [IU]/ML
14 INJECTION, SOLUTION SUBCUTANEOUS 2 TIMES DAILY
Qty: 30 ML | Refills: 3 | Status: SHIPPED | OUTPATIENT
Start: 2024-03-28

## 2024-03-28 NOTE — TELEPHONE ENCOUNTER
Requested Prescriptions     Pending Prescriptions Disp Refills    insulin glargine (LANTUS SOLOSTAR) 100 UNIT/ML injection pen 30 mL 3     Sig: Inject 14 Units into the skin 2 times daily

## 2024-06-19 ENCOUNTER — OFFICE VISIT (OUTPATIENT)
Age: 74
End: 2024-06-19
Payer: MEDICARE

## 2024-06-19 VITALS
HEART RATE: 60 BPM | WEIGHT: 184 LBS | DIASTOLIC BLOOD PRESSURE: 78 MMHG | BODY MASS INDEX: 24.92 KG/M2 | SYSTOLIC BLOOD PRESSURE: 138 MMHG | HEIGHT: 72 IN

## 2024-06-19 DIAGNOSIS — E10.9 TYPE 1 DIABETES MELLITUS WITHOUT COMPLICATION (HCC): Primary | ICD-10-CM

## 2024-06-19 LAB — HBA1C MFR BLD: 7.6 %

## 2024-06-19 PROCEDURE — 3017F COLORECTAL CA SCREEN DOC REV: CPT | Performed by: INTERNAL MEDICINE

## 2024-06-19 PROCEDURE — 1036F TOBACCO NON-USER: CPT | Performed by: INTERNAL MEDICINE

## 2024-06-19 PROCEDURE — 2022F DILAT RTA XM EVC RTNOPTHY: CPT | Performed by: INTERNAL MEDICINE

## 2024-06-19 PROCEDURE — G8420 CALC BMI NORM PARAMETERS: HCPCS | Performed by: INTERNAL MEDICINE

## 2024-06-19 PROCEDURE — 1123F ACP DISCUSS/DSCN MKR DOCD: CPT | Performed by: INTERNAL MEDICINE

## 2024-06-19 PROCEDURE — G8428 CUR MEDS NOT DOCUMENT: HCPCS | Performed by: INTERNAL MEDICINE

## 2024-06-19 PROCEDURE — 99214 OFFICE O/P EST MOD 30 MIN: CPT | Performed by: INTERNAL MEDICINE

## 2024-06-19 PROCEDURE — 3046F HEMOGLOBIN A1C LEVEL >9.0%: CPT | Performed by: INTERNAL MEDICINE

## 2024-06-19 PROCEDURE — 83036 HEMOGLOBIN GLYCOSYLATED A1C: CPT | Performed by: INTERNAL MEDICINE

## 2024-06-19 RX ORDER — TAMSULOSIN HYDROCHLORIDE 0.4 MG/1
0.4 CAPSULE ORAL EVERY EVENING
COMMUNITY

## 2024-06-19 RX ORDER — PEN NEEDLE, DIABETIC 32 GX3/16"
NEEDLE, DISPOSABLE MISCELLANEOUS
Qty: 500 EACH | Refills: 3 | Status: SHIPPED | OUTPATIENT
Start: 2024-06-19

## 2024-06-19 NOTE — PROGRESS NOTES
This is a 71-year-old male with a diagnosis of type 1 diabetes mellitus made in 2008 when he presented with blurred vision.  At our last visit, I was concerned that he had type  1 diabetes and obtain glutamic acid decarboxylase antibodies which returned positive (KHOA Ab + > 25,000) May 2022.  He has been on insulin ever since.   He recently moved from [place] where he was followed by an endocrinologist to his vacation home at [facility] where he will live permanently.  He is referred  by Sancho Montiel who we also see for his diabetes.  He has been on basal bolus insulin ever since diagnosis.      Current diabetes medications  Lantus 14 units BID  NovoLog 6 per meal plus correction of 1:50          Past medical history is notable for mitral valve disease status post mitral valve replacement in March 2020.  He also had an incidental single  vessel coronary artery bypass graft.    He also has a history of primary hypothyroidism.  He was found to have a TSH of 4.38 in 2015 with TPO antibodies that were  positive and he has been placed on levothyroxine 88 mcg daily.  He was relatively asymptomatic pre and post diagnosis.    He was admitted to [facility] with cerebral hemorrhage in January 2024.now s/p  surgery. He still has residual right homonymous hemianopia. He denies any new weakness, numbness/tingling, visual changes, or other neurological issues.  He is now cleared for driving and for the most part is doing well.  He does have some mild visual field defect in the right but otherwise he is at 70+ percent.  He is also back to playing golf.        He uses a Dexcom G7 system.  He monitors his blood sugar and actually the dosing of NovoLog is related primarily to his blood sugars throughout  the day.  When I saw him last his A1c was 7.5%.  His A1c today is 7.6 %    Examination  Blood pressure 138/78  Pulse 60  Weight 84 kg  BMI 24.9  HEENT unremarkable  Lungs clear  Heart reveals regular rate and rhythm  Abdomen

## 2024-08-20 RX ORDER — LEVOTHYROXINE SODIUM 88 UG/1
88 TABLET ORAL
Qty: 90 TABLET | Refills: 3 | Status: SHIPPED | OUTPATIENT
Start: 2024-08-20

## 2024-10-16 RX ORDER — ASPIRIN 81 MG/1
TABLET, COATED ORAL
Qty: 90 TABLET | Refills: 3 | Status: SHIPPED | OUTPATIENT
Start: 2024-10-16

## 2024-12-09 ENCOUNTER — OFFICE VISIT (OUTPATIENT)
Age: 74
End: 2024-12-09
Payer: MEDICARE

## 2024-12-09 VITALS
HEART RATE: 60 BPM | DIASTOLIC BLOOD PRESSURE: 57 MMHG | SYSTOLIC BLOOD PRESSURE: 129 MMHG | WEIGHT: 186 LBS | BODY MASS INDEX: 25.19 KG/M2 | HEIGHT: 72 IN

## 2024-12-09 DIAGNOSIS — E10.9 TYPE 1 DIABETES MELLITUS WITHOUT COMPLICATION (HCC): Primary | ICD-10-CM

## 2024-12-09 LAB — HBA1C MFR BLD: 7.5 %

## 2024-12-09 PROCEDURE — 2022F DILAT RTA XM EVC RTNOPTHY: CPT | Performed by: INTERNAL MEDICINE

## 2024-12-09 PROCEDURE — 3017F COLORECTAL CA SCREEN DOC REV: CPT | Performed by: INTERNAL MEDICINE

## 2024-12-09 PROCEDURE — 1126F AMNT PAIN NOTED NONE PRSNT: CPT | Performed by: INTERNAL MEDICINE

## 2024-12-09 PROCEDURE — G8428 CUR MEDS NOT DOCUMENT: HCPCS | Performed by: INTERNAL MEDICINE

## 2024-12-09 PROCEDURE — 1036F TOBACCO NON-USER: CPT | Performed by: INTERNAL MEDICINE

## 2024-12-09 PROCEDURE — PBSHW AMB POC HEMOGLOBIN A1C: Performed by: INTERNAL MEDICINE

## 2024-12-09 PROCEDURE — 1123F ACP DISCUSS/DSCN MKR DOCD: CPT | Performed by: INTERNAL MEDICINE

## 2024-12-09 PROCEDURE — 3046F HEMOGLOBIN A1C LEVEL >9.0%: CPT | Performed by: INTERNAL MEDICINE

## 2024-12-09 PROCEDURE — 99214 OFFICE O/P EST MOD 30 MIN: CPT | Performed by: INTERNAL MEDICINE

## 2024-12-09 PROCEDURE — G8419 CALC BMI OUT NRM PARAM NOF/U: HCPCS | Performed by: INTERNAL MEDICINE

## 2024-12-09 PROCEDURE — G8484 FLU IMMUNIZE NO ADMIN: HCPCS | Performed by: INTERNAL MEDICINE

## 2024-12-09 PROCEDURE — 83036 HEMOGLOBIN GLYCOSYLATED A1C: CPT | Performed by: INTERNAL MEDICINE

## 2024-12-09 NOTE — PROGRESS NOTES
This is a 71-year-old male with a diagnosis of type 1 diabetes mellitus made in 2008 when he presented with blurred vision.  At our last visit, I was concerned that he had type  1 diabetes and obtain glutamic acid decarboxylase antibodies which returned positive (KHOA Ab + > 25,000) May 2022.  He has been on insulin ever since.   He recently moved from [place] where he was followed by an endocrinologist to his vacation home at [facility] where he will live permanently.  He is referred  by Sancho Montiel who we also see for his diabetes.  He has been on basal bolus insulin ever since diagnosis.  When I saw him last his A1c was 7.6%.  He presents today with an A1c of 7.5%      Current diabetes medications  Lantus 14 units BID  NovoLog 6 per meal plus correction of 1:50      He presents today with concern about overnight hypoglycemia.  He has been playing with the evening dose of Lantus insulin ranging from a low of 6 to a high of 14 units to prevent the lows.  He does admit however that when his blood sugar is elevated in the evening he also gives additional NovoLog so that may be contributing to the lows as well.  He is doing better in terms of his mealtime insulin as well as carb counting and overall he seems to be doing well.    Past medical history is notable for   Mitral valve disease status post mitral valve replacement in March 2020.  He also had an incidental single  vessel coronary artery bypass graft.  Primary hypothyroidism.  He was found to have a TSH of 4.38 in 2015 with TPO antibodies that were  positive and he has been placed on levothyroxine 88 mcg daily.  He was relatively asymptomatic pre and post diagnosis  Admitted to [facility] with cerebral hemorrhage now s/p  surgery. He still has residual right homonymous hemianopia. He denies any new weakness, numbness/tingling, visual changes, or other neurological issues.    Examination  Blood pressure 129/57  Pulse 60  Weight 85 kg  BMI 25.2  HEENT

## 2024-12-16 ENCOUNTER — TELEPHONE (OUTPATIENT)
Age: 74
End: 2024-12-16

## 2024-12-16 NOTE — TELEPHONE ENCOUNTER
The patient contacted me regarding elevated blood sugars since we decreased his insulin to 8 units twice daily.  I have asked him to increase it to 10 units for at least a week and then up to 12 units if needed.  We are trying to avoid the 14 unit twice daily insulin dose which resulted in  hypoglycemia

## 2025-03-06 DIAGNOSIS — E10.9 TYPE 1 DIABETES MELLITUS WITHOUT COMPLICATION (HCC): ICD-10-CM

## 2025-03-06 NOTE — TELEPHONE ENCOUNTER
Requested Prescriptions     Pending Prescriptions Disp Refills    insulin aspart (NOVOLOG FLEXPEN) 100 UNIT/ML injection pen 30 Adjustable Dose Pre-filled Pen Syringe 3     Si units TID plus correction of 1 units for every 50 > 150    insulin glargine (LANTUS SOLOSTAR) 100 UNIT/ML injection pen 30 mL 3     Sig: Inject 12 Units into the skin 2 times daily

## 2025-03-10 RX ORDER — INSULIN ASPART 100 [IU]/ML
INJECTION, SOLUTION INTRAVENOUS; SUBCUTANEOUS
Qty: 30 ML | Refills: 3 | Status: SHIPPED | OUTPATIENT
Start: 2025-03-10

## 2025-03-10 RX ORDER — INSULIN GLARGINE 100 [IU]/ML
12 INJECTION, SOLUTION SUBCUTANEOUS 2 TIMES DAILY
Qty: 30 ML | Refills: 3 | Status: SHIPPED | OUTPATIENT
Start: 2025-03-10

## 2025-04-07 ENCOUNTER — OFFICE VISIT (OUTPATIENT)
Age: 75
End: 2025-04-07
Payer: MEDICARE

## 2025-04-07 VITALS
HEIGHT: 72 IN | DIASTOLIC BLOOD PRESSURE: 60 MMHG | HEART RATE: 56 BPM | WEIGHT: 189 LBS | BODY MASS INDEX: 25.6 KG/M2 | SYSTOLIC BLOOD PRESSURE: 144 MMHG

## 2025-04-07 DIAGNOSIS — E10.9 TYPE 1 DIABETES MELLITUS WITHOUT COMPLICATION: Primary | ICD-10-CM

## 2025-04-07 LAB — HBA1C MFR BLD: 7.2 %

## 2025-04-07 PROCEDURE — 3017F COLORECTAL CA SCREEN DOC REV: CPT | Performed by: INTERNAL MEDICINE

## 2025-04-07 PROCEDURE — 99214 OFFICE O/P EST MOD 30 MIN: CPT | Performed by: INTERNAL MEDICINE

## 2025-04-07 PROCEDURE — 83036 HEMOGLOBIN GLYCOSYLATED A1C: CPT | Performed by: INTERNAL MEDICINE

## 2025-04-07 PROCEDURE — PBSHW AMB POC HEMOGLOBIN A1C: Performed by: INTERNAL MEDICINE

## 2025-04-07 PROCEDURE — 1123F ACP DISCUSS/DSCN MKR DOCD: CPT | Performed by: INTERNAL MEDICINE

## 2025-04-07 PROCEDURE — 3051F HG A1C>EQUAL 7.0%<8.0%: CPT | Performed by: INTERNAL MEDICINE

## 2025-04-07 PROCEDURE — G8419 CALC BMI OUT NRM PARAM NOF/U: HCPCS | Performed by: INTERNAL MEDICINE

## 2025-04-07 PROCEDURE — 2022F DILAT RTA XM EVC RTNOPTHY: CPT | Performed by: INTERNAL MEDICINE

## 2025-04-07 PROCEDURE — 1036F TOBACCO NON-USER: CPT | Performed by: INTERNAL MEDICINE

## 2025-04-07 PROCEDURE — 3046F HEMOGLOBIN A1C LEVEL >9.0%: CPT | Performed by: INTERNAL MEDICINE

## 2025-04-07 PROCEDURE — 1126F AMNT PAIN NOTED NONE PRSNT: CPT | Performed by: INTERNAL MEDICINE

## 2025-04-07 PROCEDURE — G8428 CUR MEDS NOT DOCUMENT: HCPCS | Performed by: INTERNAL MEDICINE

## 2025-04-07 NOTE — PROGRESS NOTES
This is a 74-year-old male with a diagnosis of type 1 diabetes mellitus made in 2008 when he presented with blurred vision.  At our last visit, I was concerned that he had type  1 diabetes and obtain glutamic acid decarboxylase antibodies which returned positive (KHOA Ab + > 25,000) May 2022.  He has been on insulin ever since.   He recently moved from [place] where he was followed by an endocrinologist to his vacation home at [facility] where he will live permanently.  He is referred  by Sancho Montiel who we also see for his diabetes.  He has been on basal bolus insulin ever since diagnosis.  When I saw him last his A1c was 7.6%.  He presents today with an A1c of 7.2%      Current diabetes medications  Lantus 12 units BID  NovoLog 6 per meal plus correction of 1:50  Dexcom G7      He presents today with concern about overnight hypoglycemia.  He has been playing with the evening dose of Lantus insulin ranging from a low of 6 to a high of 14 units to prevent the lows.  He does admit however that when his blood sugar is elevated in the evening he also gives additional NovoLog so that may be contributing to the lows as well.  He is doing better in terms of his mealtime insulin as well as carb counting and overall he seems to be doing well.     Past medical history is notable for   Mitral valve disease status post mitral valve replacement in March 2020.  He also had an incidental single  vessel coronary artery bypass graft.  Primary hypothyroidism.  He was found to have a TSH of 4.38 in 2015 with TPO antibodies that were  positive and he has been placed on levothyroxine 88 mcg daily.  He was relatively asymptomatic pre and post diagnosis  Admitted to [facility] with cerebral hemorrhage now s/p  surgery. He still has residual right homonymous hemianopia. He denies any new weakness, numbness/tingling, or other neurological issues.  He continues to be bothered by peripheral vision issues.    Examination  Blood pressure